# Patient Record
Sex: FEMALE | Race: WHITE | HISPANIC OR LATINO | URBAN - METROPOLITAN AREA
[De-identification: names, ages, dates, MRNs, and addresses within clinical notes are randomized per-mention and may not be internally consistent; named-entity substitution may affect disease eponyms.]

---

## 2021-11-23 ENCOUNTER — EMERGENCY (EMERGENCY)
Facility: HOSPITAL | Age: 21
LOS: 1 days | Discharge: HOME | End: 2021-11-23
Attending: EMERGENCY MEDICINE | Admitting: EMERGENCY MEDICINE
Payer: MEDICAID

## 2021-11-23 VITALS
OXYGEN SATURATION: 97 % | TEMPERATURE: 100 F | DIASTOLIC BLOOD PRESSURE: 80 MMHG | WEIGHT: 135.58 LBS | HEIGHT: 62.99 IN | SYSTOLIC BLOOD PRESSURE: 141 MMHG | HEART RATE: 127 BPM | RESPIRATION RATE: 18 BRPM

## 2021-11-23 VITALS
HEART RATE: 120 BPM | WEIGHT: 136.03 LBS | SYSTOLIC BLOOD PRESSURE: 112 MMHG | TEMPERATURE: 98 F | RESPIRATION RATE: 18 BRPM | HEIGHT: 62.99 IN | DIASTOLIC BLOOD PRESSURE: 68 MMHG | OXYGEN SATURATION: 99 %

## 2021-11-23 VITALS
SYSTOLIC BLOOD PRESSURE: 122 MMHG | HEART RATE: 98 BPM | TEMPERATURE: 99 F | RESPIRATION RATE: 16 BRPM | DIASTOLIC BLOOD PRESSURE: 78 MMHG | OXYGEN SATURATION: 98 %

## 2021-11-23 LAB
ALBUMIN SERPL ELPH-MCNC: 4.1 G/DL — SIGNIFICANT CHANGE UP (ref 3.3–5)
ALP SERPL-CCNC: 113 U/L — SIGNIFICANT CHANGE UP (ref 40–120)
ALT FLD-CCNC: 86 U/L — HIGH (ref 10–45)
ANION GAP SERPL CALC-SCNC: 14 MMOL/L — SIGNIFICANT CHANGE UP (ref 5–17)
APPEARANCE UR: CLEAR — SIGNIFICANT CHANGE UP
AST SERPL-CCNC: 59 U/L — HIGH (ref 10–40)
BASOPHILS # BLD AUTO: 0.01 K/UL — SIGNIFICANT CHANGE UP (ref 0–0.2)
BASOPHILS NFR BLD AUTO: 0.1 % — SIGNIFICANT CHANGE UP (ref 0–2)
BILIRUB SERPL-MCNC: 0.3 MG/DL — SIGNIFICANT CHANGE UP (ref 0.2–1.2)
BILIRUB UR-MCNC: NEGATIVE — SIGNIFICANT CHANGE UP
BUN SERPL-MCNC: 10 MG/DL — SIGNIFICANT CHANGE UP (ref 7–23)
CALCIUM SERPL-MCNC: 8.8 MG/DL — SIGNIFICANT CHANGE UP (ref 8.4–10.5)
CHLORIDE SERPL-SCNC: 101 MMOL/L — SIGNIFICANT CHANGE UP (ref 96–108)
CO2 SERPL-SCNC: 23 MMOL/L — SIGNIFICANT CHANGE UP (ref 22–31)
COLOR SPEC: YELLOW — SIGNIFICANT CHANGE UP
CREAT SERPL-MCNC: 0.68 MG/DL — SIGNIFICANT CHANGE UP (ref 0.5–1.3)
DIFF PNL FLD: ABNORMAL
EOSINOPHIL # BLD AUTO: 0 K/UL — SIGNIFICANT CHANGE UP (ref 0–0.5)
EOSINOPHIL NFR BLD AUTO: 0 % — SIGNIFICANT CHANGE UP (ref 0–6)
GLUCOSE SERPL-MCNC: 145 MG/DL — HIGH (ref 70–99)
GLUCOSE UR QL: NEGATIVE — SIGNIFICANT CHANGE UP
HCT VFR BLD CALC: 40.9 % — SIGNIFICANT CHANGE UP (ref 34.5–45)
HGB BLD-MCNC: 14 G/DL — SIGNIFICANT CHANGE UP (ref 11.5–15.5)
IMM GRANULOCYTES NFR BLD AUTO: 0.3 % — SIGNIFICANT CHANGE UP (ref 0–1.5)
KETONES UR-MCNC: ABNORMAL
LEUKOCYTE ESTERASE UR-ACNC: NEGATIVE — SIGNIFICANT CHANGE UP
LIDOCAIN IGE QN: 80 U/L — SIGNIFICANT CHANGE UP (ref 73–393)
LYMPHOCYTES # BLD AUTO: 1.67 K/UL — SIGNIFICANT CHANGE UP (ref 1–3.3)
LYMPHOCYTES # BLD AUTO: 21.2 % — SIGNIFICANT CHANGE UP (ref 13–44)
MAGNESIUM SERPL-MCNC: 2 MG/DL — SIGNIFICANT CHANGE UP (ref 1.6–2.6)
MCHC RBC-ENTMCNC: 31 PG — SIGNIFICANT CHANGE UP (ref 27–34)
MCHC RBC-ENTMCNC: 34.2 GM/DL — SIGNIFICANT CHANGE UP (ref 32–36)
MCV RBC AUTO: 90.7 FL — SIGNIFICANT CHANGE UP (ref 80–100)
MONOCYTES # BLD AUTO: 0.38 K/UL — SIGNIFICANT CHANGE UP (ref 0–0.9)
MONOCYTES NFR BLD AUTO: 4.8 % — SIGNIFICANT CHANGE UP (ref 2–14)
NEUTROPHILS # BLD AUTO: 5.79 K/UL — SIGNIFICANT CHANGE UP (ref 1.8–7.4)
NEUTROPHILS NFR BLD AUTO: 73.6 % — SIGNIFICANT CHANGE UP (ref 43–77)
NITRITE UR-MCNC: NEGATIVE — SIGNIFICANT CHANGE UP
NRBC # BLD: 0 /100 WBCS — SIGNIFICANT CHANGE UP (ref 0–0)
PH UR: 6 — SIGNIFICANT CHANGE UP (ref 5–8)
PLATELET # BLD AUTO: 154 K/UL — SIGNIFICANT CHANGE UP (ref 150–400)
POTASSIUM SERPL-MCNC: 3.3 MMOL/L — LOW (ref 3.5–5.3)
POTASSIUM SERPL-SCNC: 3.3 MMOL/L — LOW (ref 3.5–5.3)
PROT SERPL-MCNC: 8.8 G/DL — HIGH (ref 6–8.3)
PROT UR-MCNC: 100
RBC # BLD: 4.51 M/UL — SIGNIFICANT CHANGE UP (ref 3.8–5.2)
RBC # FLD: 13.1 % — SIGNIFICANT CHANGE UP (ref 10.3–14.5)
SARS-COV-2 RNA SPEC QL NAA+PROBE: DETECTED
SODIUM SERPL-SCNC: 138 MMOL/L — SIGNIFICANT CHANGE UP (ref 135–145)
SP GR SPEC: 1.02 — SIGNIFICANT CHANGE UP (ref 1.01–1.02)
UROBILINOGEN FLD QL: 4
WBC # BLD: 7.87 K/UL — SIGNIFICANT CHANGE UP (ref 3.8–10.5)
WBC # FLD AUTO: 7.87 K/UL — SIGNIFICANT CHANGE UP (ref 3.8–10.5)

## 2021-11-23 PROCEDURE — 85025 COMPLETE CBC W/AUTO DIFF WBC: CPT

## 2021-11-23 PROCEDURE — 36415 COLL VENOUS BLD VENIPUNCTURE: CPT

## 2021-11-23 PROCEDURE — 80053 COMPREHEN METABOLIC PANEL: CPT

## 2021-11-23 PROCEDURE — 99285 EMERGENCY DEPT VISIT HI MDM: CPT

## 2021-11-23 PROCEDURE — 93005 ELECTROCARDIOGRAM TRACING: CPT

## 2021-11-23 PROCEDURE — U0003: CPT

## 2021-11-23 PROCEDURE — 99284 EMERGENCY DEPT VISIT MOD MDM: CPT

## 2021-11-23 PROCEDURE — 83690 ASSAY OF LIPASE: CPT

## 2021-11-23 PROCEDURE — 96375 TX/PRO/DX INJ NEW DRUG ADDON: CPT

## 2021-11-23 PROCEDURE — 93010 ELECTROCARDIOGRAM REPORT: CPT

## 2021-11-23 PROCEDURE — 99284 EMERGENCY DEPT VISIT MOD MDM: CPT | Mod: 25

## 2021-11-23 PROCEDURE — 93010 ELECTROCARDIOGRAM REPORT: CPT | Mod: 76

## 2021-11-23 PROCEDURE — 96374 THER/PROPH/DIAG INJ IV PUSH: CPT

## 2021-11-23 PROCEDURE — 81001 URINALYSIS AUTO W/SCOPE: CPT

## 2021-11-23 PROCEDURE — 99283 EMERGENCY DEPT VISIT LOW MDM: CPT

## 2021-11-23 PROCEDURE — 83735 ASSAY OF MAGNESIUM: CPT

## 2021-11-23 PROCEDURE — U0005: CPT

## 2021-11-23 RX ORDER — POTASSIUM CHLORIDE 20 MEQ
40 PACKET (EA) ORAL ONCE
Refills: 0 | Status: COMPLETED | OUTPATIENT
Start: 2021-11-23 | End: 2021-11-23

## 2021-11-23 RX ORDER — ONDANSETRON 8 MG/1
4 TABLET, FILM COATED ORAL ONCE
Refills: 0 | Status: COMPLETED | OUTPATIENT
Start: 2021-11-23 | End: 2021-11-23

## 2021-11-23 RX ORDER — ONDANSETRON 8 MG/1
1 TABLET, FILM COATED ORAL
Qty: 20 | Refills: 0
Start: 2021-11-23

## 2021-11-23 RX ORDER — SODIUM CHLORIDE 9 MG/ML
2000 INJECTION INTRAMUSCULAR; INTRAVENOUS; SUBCUTANEOUS ONCE
Refills: 0 | Status: COMPLETED | OUTPATIENT
Start: 2021-11-23 | End: 2021-11-23

## 2021-11-23 RX ORDER — KETOROLAC TROMETHAMINE 30 MG/ML
30 SYRINGE (ML) INJECTION ONCE
Refills: 0 | Status: DISCONTINUED | OUTPATIENT
Start: 2021-11-23 | End: 2021-11-23

## 2021-11-23 RX ORDER — ACETAMINOPHEN 500 MG
975 TABLET ORAL ONCE
Refills: 0 | Status: COMPLETED | OUTPATIENT
Start: 2021-11-23 | End: 2021-11-23

## 2021-11-23 RX ADMIN — ONDANSETRON 4 MILLIGRAM(S): 8 TABLET, FILM COATED ORAL at 00:30

## 2021-11-23 RX ADMIN — SODIUM CHLORIDE 2000 MILLILITER(S): 9 INJECTION INTRAMUSCULAR; INTRAVENOUS; SUBCUTANEOUS at 00:30

## 2021-11-23 RX ADMIN — Medication 975 MILLIGRAM(S): at 23:42

## 2021-11-23 RX ADMIN — ONDANSETRON 4 MILLIGRAM(S): 8 TABLET, FILM COATED ORAL at 23:42

## 2021-11-23 RX ADMIN — Medication 30 MILLIGRAM(S): at 00:31

## 2021-11-23 RX ADMIN — SODIUM CHLORIDE 2000 MILLILITER(S): 9 INJECTION INTRAMUSCULAR; INTRAVENOUS; SUBCUTANEOUS at 01:26

## 2021-11-23 RX ADMIN — Medication 40 MILLIEQUIVALENT(S): at 01:28

## 2021-11-23 NOTE — ED PROVIDER NOTE - CLINICAL SUMMARY MEDICAL DECISION MAKING FREE TEXT BOX
22 yo F c 6 days n/v/d, nonspecific chest/back/abd pains, cough. likely viral syndrome.  check labs, lytes, covid pcr. r/o renal failure, dehydration, electrolyte abnormality. give iv fluids, zofran, toradol. reassess 22 yo F c 6 days n/v/d, nonspecific chest/back/abd pains, cough. likely viral syndrome.  check labs, lytes, covid pcr. r/o renal failure, dehydration, electrolyte abnormality. give iv fluids, zofran, toradol. reassess    0120: pt feeling better. no n/v. tolerated po fluids. labs ok. mild hypoK, given supplement. informed pt of mildly elevated LFT. likely due to viral ifnx. told to f/u pmd.

## 2021-11-23 NOTE — ED ADULT NURSE NOTE - OBJECTIVE STATEMENT
Pt presents to the ED with c/o chest pain and sob. PT was dx with covid today. Was seen in ED yesterday and swabbed.

## 2021-11-23 NOTE — ED PROVIDER NOTE - NSFOLLOWUPINSTRUCTIONS_ED_ALL_ED_FT
- take zofran (ondansetron) as directed as needed for nausea  - drink plenty of fluids like pedialyte, clear soups, gatorade  - see your doctor regarding mildly abnormal liver blood tests this week  - you had a covid19 pcr test done. results can take 1-2 days.  you should quarantine until you get your results and further instructions          - tome zofran (ondansetron) según las indicaciones según sea necesario para las náuseas  - abiel muchos líquidos brenda pedialyte, sopas claras, gatorade  - consulte a [place] médico esta semana si le informa sobre análisis de argenis del hígado levemente anormales    - se hizo lilia prueba de covid19 pcr. los resultados pueden tardar entre 1 y 2 días. debe poner en cuarentena hasta que obtenga los resultados y más instrucciones    Seguimiento en 1-3 días con [place] propio médico o con  [facility] familiar  [facility]  [address] [place] [id number]  Teléfono: [phone]        Gastroenteritis    LO QUE NECESITA SABER:    La gastroenteritis, o gripe estomacal, es lilia infección del estómago y los intestinos.     Tracto digestivo         INSTRUCCIONES SOBRE EL [facility]ITAL[facility]:    Llame al 911 en reynaldo de presentar lo siguiente:  •Usted tiene dificultad para respirar o pulso acelerado.          Regrese a la karl de emergencias si:  •Usted ve argenis en [place] diarrea.      •Usted no puede dejar de vomitar.      •Usted no ha orinado en 12 horas.      •Usted siente que se va a desmayar.      Comuníquese con [place] médico si:  •Tiene fiebre.      •Usted continúa con vómitos o diarrea aún después del tratamiento.      •Usted ve lombrices en arrington diarrea.      •[place] boca u ojos están secos. Usted no está orinando tanto o con la misma frecuencia.      •Usted tiene preguntas o inquietudes acerca de ar[place] condición o cuidado.      Medicamentos:  •Los medicamentosse pueden administrar para detener los vómitos o la diarrea, disminuir los calambres abdominales o tratar lilia infección.      •[place] ari medicamentos brenda se le haya indicado.Consulte con [place] médico si usted feli que [place] medicamento no le está ayudando o si presenta efectos secundarios. Infórmele si es alérgico a cualquier medicamento. Mantenga lilia lista actualizada de los medicamentos, las vitaminas y los productos herbales que jose m. Incluya los siguientes datos de los medicamentos: cantidad, frecuencia y motivo de administración. Traiga con usted la lista o los envases de las píldoras a ari citas de seguimiento. Lleve la lista de los medicamentos con usted en reynaldo de lilia emergencia.      El manejo de ari síntomas:  •[place] líquidos brenda se le haya indicado.Pregunte a [place] médico qué cantidad de líquido debe beber a diario y qué líquidos le recomienda. Es posible que también necesite devin lilia solución de rehidratación oral (SRO). Lilia SRO contiene la cantidad adecuada de azúcar, sal y minerales en agua para reponer los líquidos corporales.      •Consuma alimentos blandos.Cuando usted sienta hambre, empiece a comer alimentos suaves y blandos. Ejemplos son los plátanos, sopas claras, jennifer y puré de manzana. No consuma productos lácteos, alcohol, bebidas azucaradas, o bebidas con cafeína hasta que se sienta mejor.      •Descanse el mayor tiempo posible.Cuando se empiece a sentir mejor, comience poco a poco a hacer más cada día.      Evite la propagación de la gastroenteritis:La gastroenteritis se puede propagar fácilmente. Manténgase usted, ar[place] willi y ari alrededores limpios para ayudar a evitar la propagación de la gastroenteritis:   •Lávese las salo frecuentemente.Utilice agua y jabón. Lávese las salo después de usar el baño, cambiarle el pañal a un juliann o estornudar. Lávese las salo antes de comer o preparar alimentos.   Lavado de salo           •Limpie las superficies y lave la ropa con frecuencia.Lave [place] ropa y ari toallas por separado del alexys de la ropa. Limpie las superficies de ar[place] hogar con limpiador antibacterial o con blanqueador.      •Lave y cocine tommy los alimentos.Lave las verduras crudas antes de cocinar. Cocine tommy las surekha, pescados y huevos. No utilice los mismos platos para las surekha crudas que para otros alimentos. Ponga en el refrigerador inmediatamente cualquier alimento que haya sobrado.      •Esté alerta cuando usted vaya de campamento o cuando viaje.Solamente tome agua limpia. No tome agua de los heather o mansoor a menos que usted purifique o hierva el agua estefany. Cuando viaje, tome agua embotellada y no le ponga hielo. No coma fruta con la cáscara. No coma pescado crudo o surekha que no están cocinadas completamente.      Acuda a la consulta de control con [place] médico según las indicaciones:Anote ari preguntas para que se acuerde de hacerlas jeff ari visitas.

## 2021-11-23 NOTE — ED PROVIDER NOTE - NSFOLLOWUPCLINICS_GEN_ALL_ED_FT
[facility]  Family Medicine  [address] [place] [id number]  Phone: [phone]  Fax:   Follow Up Time: 1-3 Days

## 2021-11-23 NOTE — ED PROVIDER NOTE - OBJECTIVE STATEMENT
20 yo F c/o vomiting, nbnb, moderate, for last 6 days. 2x/today.  unable to tolerate po. states she vomits whatever she eats or drinks. assoc c diarrhea, small amt. also c/o abd pain, chest and back pains. no sob. no fever/chills. feels weak.  no travel. family member c cold sxs recently. pt also c some cough and sore throat. no surg hx

## 2021-11-23 NOTE — ED ADULT NURSE NOTE - OBJECTIVE STATEMENT
Pt arrives to ER reporting 6 days of n/v/d, decreased PO intake as she vomits anytime she eats, mild cough and sore throat. Pt reports fever in the first 3 days but not since, reports chest and "lung" pain, denies sob.

## 2021-11-23 NOTE — ED PROVIDER NOTE - PATIENT PORTAL LINK FT
You can access the FollowMyHealth Patient Portal offered by [facility] by registering at the following website: [email or web address]. By joining [employer]’s FollowMyHealth portal, you will also be able to view your health information using other applications (apps) compatible with our system.

## 2021-11-24 VITALS — HEART RATE: 98 BPM | OXYGEN SATURATION: 98 % | RESPIRATION RATE: 16 BRPM

## 2021-11-24 PROBLEM — Z78.9 OTHER SPECIFIED HEALTH STATUS: Chronic | Status: ACTIVE | Noted: 2021-11-23

## 2021-11-24 NOTE — ED PROVIDER NOTE - NSFOLLOWUPINSTRUCTIONS_ED_ALL_ED_FT
-- Follow up with your primary care physician in 48 hours.    -- Return to the ER for worsening or persistent symptoms, and/or ANY NEW OR CONCERNING SYMPTOMS.    -- If you have difficulty following up, please call: [phone] to obtain a [facility] doctor or specialist who takes your insurance in your area.

## 2021-11-24 NOTE — ED PROVIDER NOTE - OBJECTIVE STATEMENT
pt covid positive, seen here yesterday for cp, sob and weakness with normal labs and work up.  pt returns today c/o nausea but has not picked up her prescribed zofran yet.  pt denies any vomiting, is tolerating PO, but states is weak.

## 2021-11-24 NOTE — ED PROVIDER NOTE - CLINICAL SUMMARY MEDICAL DECISION MAKING FREE TEXT BOX
pt COVID positive pt COVID positive, pt reassured that vitals are normal, that she is breathing fine, pt is tolerating PO, encouraged to  her zofran.  has home pulse ox.

## 2021-12-22 NOTE — ED PROVIDER NOTE - PATIENT PORTAL LINK FT
You can access the FollowMyHealth Patient Portal offered by [facility] by registering at the following website: [email or web address]. By joining [employer]’s FollowMyHealth portal, you will also be able to view your health information using other applications (apps) compatible with our system.
No

## 2023-04-25 PROBLEM — Z00.00 ENCOUNTER FOR PREVENTIVE HEALTH EXAMINATION: Status: ACTIVE | Noted: 2023-04-25

## 2023-04-26 ENCOUNTER — APPOINTMENT (OUTPATIENT)
Dept: OBGYN | Facility: CLINIC | Age: 23
End: 2023-04-26

## 2023-05-10 ENCOUNTER — APPOINTMENT (OUTPATIENT)
Dept: OBGYN | Facility: CLINIC | Age: 23
End: 2023-05-10